# Patient Record
Sex: MALE | URBAN - METROPOLITAN AREA
[De-identification: names, ages, dates, MRNs, and addresses within clinical notes are randomized per-mention and may not be internally consistent; named-entity substitution may affect disease eponyms.]

---

## 2017-03-07 ENCOUNTER — EMERGENCY (EMERGENCY)
Facility: HOSPITAL | Age: 47
LOS: 1 days | Discharge: HOME | End: 2017-03-07
Attending: EMERGENCY MEDICINE | Admitting: EMERGENCY MEDICINE
Payer: COMMERCIAL

## 2017-03-07 VITALS
OXYGEN SATURATION: 99 % | HEART RATE: 56 BPM | TEMPERATURE: 99 F | SYSTOLIC BLOOD PRESSURE: 118 MMHG | DIASTOLIC BLOOD PRESSURE: 75 MMHG | RESPIRATION RATE: 20 BRPM

## 2017-03-07 VITALS
DIASTOLIC BLOOD PRESSURE: 79 MMHG | WEIGHT: 240.08 LBS | HEART RATE: 60 BPM | SYSTOLIC BLOOD PRESSURE: 123 MMHG | HEIGHT: 73 IN | TEMPERATURE: 99 F | OXYGEN SATURATION: 100 % | RESPIRATION RATE: 18 BRPM

## 2017-03-07 LAB
ALBUMIN SERPL ELPH-MCNC: 4.7 G/DL — SIGNIFICANT CHANGE UP (ref 3.3–5)
ALP SERPL-CCNC: 66 U/L — SIGNIFICANT CHANGE UP (ref 40–120)
ALT FLD-CCNC: 26 U/L RC — SIGNIFICANT CHANGE UP (ref 10–45)
ANION GAP SERPL CALC-SCNC: 12 MMOL/L — SIGNIFICANT CHANGE UP (ref 5–17)
APPEARANCE UR: CLEAR — SIGNIFICANT CHANGE UP
AST SERPL-CCNC: 25 U/L — SIGNIFICANT CHANGE UP (ref 10–40)
BASOPHILS # BLD AUTO: 0.1 K/UL — SIGNIFICANT CHANGE UP (ref 0–0.2)
BASOPHILS NFR BLD AUTO: 0.7 % — SIGNIFICANT CHANGE UP (ref 0–2)
BILIRUB SERPL-MCNC: 0.5 MG/DL — SIGNIFICANT CHANGE UP (ref 0.2–1.2)
BILIRUB UR-MCNC: NEGATIVE — SIGNIFICANT CHANGE UP
BUN SERPL-MCNC: 15 MG/DL — SIGNIFICANT CHANGE UP (ref 7–23)
CALCIUM SERPL-MCNC: 9.3 MG/DL — SIGNIFICANT CHANGE UP (ref 8.4–10.5)
CHLORIDE SERPL-SCNC: 102 MMOL/L — SIGNIFICANT CHANGE UP (ref 96–108)
CO2 SERPL-SCNC: 27 MMOL/L — SIGNIFICANT CHANGE UP (ref 22–31)
COLOR SPEC: COLORLESS — SIGNIFICANT CHANGE UP
CREAT SERPL-MCNC: 1.18 MG/DL — SIGNIFICANT CHANGE UP (ref 0.5–1.3)
DIFF PNL FLD: NEGATIVE — SIGNIFICANT CHANGE UP
EOSINOPHIL # BLD AUTO: 0.2 K/UL — SIGNIFICANT CHANGE UP (ref 0–0.5)
EOSINOPHIL NFR BLD AUTO: 2.4 % — SIGNIFICANT CHANGE UP (ref 0–6)
GAS PNL BLDV: SIGNIFICANT CHANGE UP
GLUCOSE SERPL-MCNC: 103 MG/DL — HIGH (ref 70–99)
GLUCOSE UR QL: NEGATIVE — SIGNIFICANT CHANGE UP
HCT VFR BLD CALC: 43.1 % — SIGNIFICANT CHANGE UP (ref 39–50)
HGB BLD-MCNC: 14.8 G/DL — SIGNIFICANT CHANGE UP (ref 13–17)
KETONES UR-MCNC: NEGATIVE — SIGNIFICANT CHANGE UP
LEUKOCYTE ESTERASE UR-ACNC: NEGATIVE — SIGNIFICANT CHANGE UP
LIDOCAIN IGE QN: 38 U/L — SIGNIFICANT CHANGE UP (ref 7–60)
LYMPHOCYTES # BLD AUTO: 2.3 K/UL — SIGNIFICANT CHANGE UP (ref 1–3.3)
LYMPHOCYTES # BLD AUTO: 29.9 % — SIGNIFICANT CHANGE UP (ref 13–44)
MCHC RBC-ENTMCNC: 29.8 PG — SIGNIFICANT CHANGE UP (ref 27–34)
MCHC RBC-ENTMCNC: 34.3 GM/DL — SIGNIFICANT CHANGE UP (ref 32–36)
MCV RBC AUTO: 86.9 FL — SIGNIFICANT CHANGE UP (ref 80–100)
MONOCYTES # BLD AUTO: 0.7 K/UL — SIGNIFICANT CHANGE UP (ref 0–0.9)
MONOCYTES NFR BLD AUTO: 8.8 % — SIGNIFICANT CHANGE UP (ref 2–14)
NEUTROPHILS # BLD AUTO: 4.6 K/UL — SIGNIFICANT CHANGE UP (ref 1.8–7.4)
NEUTROPHILS NFR BLD AUTO: 58.2 % — SIGNIFICANT CHANGE UP (ref 43–77)
NITRITE UR-MCNC: NEGATIVE — SIGNIFICANT CHANGE UP
PH UR: 6 — SIGNIFICANT CHANGE UP (ref 4.8–8)
PLATELET # BLD AUTO: 184 K/UL — SIGNIFICANT CHANGE UP (ref 150–400)
POTASSIUM SERPL-MCNC: 4.3 MMOL/L — SIGNIFICANT CHANGE UP (ref 3.5–5.3)
POTASSIUM SERPL-SCNC: 4.3 MMOL/L — SIGNIFICANT CHANGE UP (ref 3.5–5.3)
PROT SERPL-MCNC: 7.3 G/DL — SIGNIFICANT CHANGE UP (ref 6–8.3)
PROT UR-MCNC: NEGATIVE — SIGNIFICANT CHANGE UP
RBC # BLD: 4.96 M/UL — SIGNIFICANT CHANGE UP (ref 4.2–5.8)
RBC # FLD: 12.2 % — SIGNIFICANT CHANGE UP (ref 10.3–14.5)
SODIUM SERPL-SCNC: 141 MMOL/L — SIGNIFICANT CHANGE UP (ref 135–145)
SP GR SPEC: 1 — LOW (ref 1.01–1.02)
UROBILINOGEN FLD QL: NEGATIVE — SIGNIFICANT CHANGE UP
WBC # BLD: 7.8 K/UL — SIGNIFICANT CHANGE UP (ref 3.8–10.5)
WBC # FLD AUTO: 7.8 K/UL — SIGNIFICANT CHANGE UP (ref 3.8–10.5)

## 2017-03-07 PROCEDURE — 84132 ASSAY OF SERUM POTASSIUM: CPT

## 2017-03-07 PROCEDURE — 74176 CT ABD & PELVIS W/O CONTRAST: CPT | Mod: 26

## 2017-03-07 PROCEDURE — 82435 ASSAY OF BLOOD CHLORIDE: CPT

## 2017-03-07 PROCEDURE — 84295 ASSAY OF SERUM SODIUM: CPT

## 2017-03-07 PROCEDURE — 99284 EMERGENCY DEPT VISIT MOD MDM: CPT | Mod: 25

## 2017-03-07 PROCEDURE — 83690 ASSAY OF LIPASE: CPT

## 2017-03-07 PROCEDURE — 99285 EMERGENCY DEPT VISIT HI MDM: CPT

## 2017-03-07 PROCEDURE — 81001 URINALYSIS AUTO W/SCOPE: CPT

## 2017-03-07 PROCEDURE — 80053 COMPREHEN METABOLIC PANEL: CPT

## 2017-03-07 PROCEDURE — 85027 COMPLETE CBC AUTOMATED: CPT

## 2017-03-07 PROCEDURE — 83605 ASSAY OF LACTIC ACID: CPT

## 2017-03-07 PROCEDURE — 82947 ASSAY GLUCOSE BLOOD QUANT: CPT

## 2017-03-07 PROCEDURE — 96374 THER/PROPH/DIAG INJ IV PUSH: CPT

## 2017-03-07 PROCEDURE — 85014 HEMATOCRIT: CPT

## 2017-03-07 PROCEDURE — 82803 BLOOD GASES ANY COMBINATION: CPT

## 2017-03-07 PROCEDURE — 74176 CT ABD & PELVIS W/O CONTRAST: CPT

## 2017-03-07 PROCEDURE — 82330 ASSAY OF CALCIUM: CPT

## 2017-03-07 RX ORDER — OXYCODONE HYDROCHLORIDE 5 MG/1
1 TABLET ORAL
Qty: 12 | Refills: 0 | OUTPATIENT
Start: 2017-03-07

## 2017-03-07 RX ORDER — KETOROLAC TROMETHAMINE 30 MG/ML
15 SYRINGE (ML) INJECTION ONCE
Qty: 0 | Refills: 0 | Status: DISCONTINUED | OUTPATIENT
Start: 2017-03-07 | End: 2017-03-07

## 2017-03-07 RX ORDER — OXYCODONE HYDROCHLORIDE 5 MG/1
1 TABLET ORAL
Qty: 12 | Refills: 0
Start: 2017-03-07

## 2017-03-07 RX ORDER — SODIUM CHLORIDE 9 MG/ML
1000 INJECTION INTRAMUSCULAR; INTRAVENOUS; SUBCUTANEOUS ONCE
Qty: 0 | Refills: 0 | Status: COMPLETED | OUTPATIENT
Start: 2017-03-07 | End: 2017-03-07

## 2017-03-07 RX ADMIN — SODIUM CHLORIDE 2000 MILLILITER(S): 9 INJECTION INTRAMUSCULAR; INTRAVENOUS; SUBCUTANEOUS at 12:08

## 2017-03-07 RX ADMIN — Medication 15 MILLIGRAM(S): at 12:08

## 2017-03-07 NOTE — ED PROVIDER NOTE - OBJECTIVE STATEMENT
Resident: 46y M presents with LLQ pain x 1 day. Pain woke him from sleep, is dull/achey, severe, worse with movement, no relieving factors or meds, non-radiating. Never had this pain before. Denies nausea, vomiting, diarrhea, fever, chills.    No PMD at this time.     Pharm: [facility], [address], [place]

## 2017-03-07 NOTE — ED PROVIDER NOTE - CHPI ED SYMPTOMS NEG
no fever/no diarrhea/no burning urination/no vomiting/no dysuria/no chills/no blood in stool/no hematuria/no nausea

## 2017-03-07 NOTE — ED PROVIDER NOTE - PLAN OF CARE
1. Epiploic appendagitis  2. Take ibuprofen every 4-6 hours as needed for pain control. For severe pain, take oxycodone as directed.  3. Follow-up with your primary care doctor in 24-48hrs.  4. Return immediately to the emergency department if any worsening or concerning symptoms.

## 2017-03-07 NOTE — ED PROVIDER NOTE - CARE PLAN
Principal Discharge DX:	Abdominal pain Principal Discharge DX:	Epiploic appendagitis  Instructions for follow-up, activity and diet:	1. Epiploic appendagitis  2. Take ibuprofen every 4-6 hours as needed for pain control. For severe pain, take oxycodone as directed.  3. Follow-up with your primary care doctor in 24-48hrs.  4. Return immediately to the emergency department if any worsening or concerning symptoms.

## 2017-03-07 NOTE — ED PROVIDER NOTE - MEDICAL DECISION MAKING DETAILS
Resident: LLQ pain. well-appearing, afebrile. tenderness to palpation llq and periumbilical. Concern for diverticulitis. CT, fluids, pain control, basic labs, reassess. Resident: 46y M presents with LLQ pain x 1 day. Pain woke him from sleep, LLQ pain. well-appearing, afebrile. tenderness to palpation llq and periumbilical. Concern for diverticulitis. CT abd, fluids, pain control, basic labs, reassess.

## 2017-03-07 NOTE — ED PROVIDER NOTE - PROGRESS NOTE DETAILS
JOYCE: Pain better, declined MS, pending CT Resident: discussed CT results with patient. Will send home with pain meds. all patient questions answered. -AV

## 2017-03-07 NOTE — ED PROVIDER NOTE - ATTENDING CONTRIBUTION TO CARE
46y M presents with LLQ pain x 1 day. Pain woke him from sleep, LLQ pain. well-appearing, afebrile. tenderness to palpation llq and periumbilical. Concern for diverticulitis. CT abd, fluids, pain control, basic labs, reassess.

## 2017-03-07 NOTE — ED ADULT NURSE NOTE - CHPI ED SYMPTOMS NEG
no vomiting/no blood in stool/no nausea/no dysuria/no burning urination/no fever/no hematuria/no chills/no diarrhea

## 2017-03-11 DIAGNOSIS — Z88.8 ALLERGY STATUS TO OTHER DRUGS, MEDICAMENTS AND BIOLOGICAL SUBSTANCES STATUS: ICD-10-CM

## 2017-03-11 DIAGNOSIS — R10.32 LEFT LOWER QUADRANT PAIN: ICD-10-CM

## 2017-03-11 DIAGNOSIS — K63.89 OTHER SPECIFIED DISEASES OF INTESTINE: ICD-10-CM

## 2017-03-11 DIAGNOSIS — Z87.891 PERSONAL HISTORY OF NICOTINE DEPENDENCE: ICD-10-CM

## 2017-03-14 PROBLEM — Z00.00 ENCOUNTER FOR PREVENTIVE HEALTH EXAMINATION: Status: ACTIVE | Noted: 2017-03-14

## 2017-06-01 ENCOUNTER — APPOINTMENT (OUTPATIENT)
Dept: INTERNAL MEDICINE | Facility: CLINIC | Age: 47
End: 2017-06-01

## 2017-06-01 ENCOUNTER — RECORD ABSTRACTING (OUTPATIENT)
Age: 47
End: 2017-06-01

## 2017-06-01 DIAGNOSIS — Q23.1 CONGENITAL INSUFFICIENCY OF AORTIC VALVE: ICD-10-CM

## 2017-06-01 DIAGNOSIS — E66.9 OBESITY, UNSPECIFIED: ICD-10-CM

## 2017-06-01 DIAGNOSIS — Z87.891 PERSONAL HISTORY OF NICOTINE DEPENDENCE: ICD-10-CM

## 2017-06-01 DIAGNOSIS — J45.909 UNSPECIFIED ASTHMA, UNCOMPLICATED: ICD-10-CM

## 2017-06-01 DIAGNOSIS — J98.4 OTHER DISORDERS OF LUNG: ICD-10-CM

## 2017-06-01 DIAGNOSIS — G47.33 OBSTRUCTIVE SLEEP APNEA (ADULT) (PEDIATRIC): ICD-10-CM

## 2017-06-01 DIAGNOSIS — C43.9 MALIGNANT MELANOMA OF SKIN, UNSPECIFIED: ICD-10-CM

## 2017-06-01 DIAGNOSIS — M10.9 GOUT, UNSPECIFIED: ICD-10-CM

## 2017-06-01 DIAGNOSIS — I38 ENDOCARDITIS, VALVE UNSPECIFIED: ICD-10-CM

## 2017-06-29 ENCOUNTER — APPOINTMENT (OUTPATIENT)
Dept: MRI IMAGING | Facility: CLINIC | Age: 47
End: 2017-06-29

## 2017-06-29 ENCOUNTER — OUTPATIENT (OUTPATIENT)
Dept: OUTPATIENT SERVICES | Facility: HOSPITAL | Age: 47
LOS: 1 days | End: 2017-06-29
Payer: COMMERCIAL

## 2017-06-29 DIAGNOSIS — Z00.8 ENCOUNTER FOR OTHER GENERAL EXAMINATION: ICD-10-CM

## 2017-06-29 PROCEDURE — A9585: CPT

## 2017-06-29 PROCEDURE — C8902: CPT

## 2017-06-29 PROCEDURE — C8911: CPT

## 2020-01-16 ENCOUNTER — OUTPATIENT (OUTPATIENT)
Dept: OUTPATIENT SERVICES | Facility: HOSPITAL | Age: 50
LOS: 1 days | Discharge: HOME | End: 2020-01-16

## 2020-01-16 VITALS
OXYGEN SATURATION: 99 % | DIASTOLIC BLOOD PRESSURE: 86 MMHG | TEMPERATURE: 97 F | HEART RATE: 63 BPM | WEIGHT: 259.93 LBS | RESPIRATION RATE: 19 BRPM | SYSTOLIC BLOOD PRESSURE: 145 MMHG | HEIGHT: 73 IN

## 2020-01-16 DIAGNOSIS — Z12.11 ENCOUNTER FOR SCREENING FOR MALIGNANT NEOPLASM OF COLON: ICD-10-CM

## 2020-01-16 DIAGNOSIS — Z85.828 PERSONAL HISTORY OF OTHER MALIGNANT NEOPLASM OF SKIN: Chronic | ICD-10-CM

## 2020-01-16 RX ORDER — ASPIRIN/CALCIUM CARB/MAGNESIUM 324 MG
1 TABLET ORAL
Qty: 0 | Refills: 0 | DISCHARGE

## 2020-01-16 RX ORDER — ALLOPURINOL 300 MG
1 TABLET ORAL
Qty: 0 | Refills: 0 | DISCHARGE

## 2020-01-16 RX ORDER — ALLOPURINOL 300 MG
0 TABLET ORAL
Qty: 0 | Refills: 0 | DISCHARGE

## 2020-01-23 DIAGNOSIS — Z91.041 RADIOGRAPHIC DYE ALLERGY STATUS: ICD-10-CM

## 2020-01-23 DIAGNOSIS — Z12.11 ENCOUNTER FOR SCREENING FOR MALIGNANT NEOPLASM OF COLON: ICD-10-CM

## 2020-01-23 DIAGNOSIS — Z99.89 DEPENDENCE ON OTHER ENABLING MACHINES AND DEVICES: ICD-10-CM

## 2020-01-23 DIAGNOSIS — Z85.820 PERSONAL HISTORY OF MALIGNANT MELANOMA OF SKIN: ICD-10-CM

## 2020-01-23 DIAGNOSIS — K64.8 OTHER HEMORRHOIDS: ICD-10-CM

## 2020-01-23 DIAGNOSIS — G47.33 OBSTRUCTIVE SLEEP APNEA (ADULT) (PEDIATRIC): ICD-10-CM

## 2020-01-23 DIAGNOSIS — M10.9 GOUT, UNSPECIFIED: ICD-10-CM

## 2020-01-23 DIAGNOSIS — K57.30 DIVERTICULOSIS OF LARGE INTESTINE WITHOUT PERFORATION OR ABSCESS WITHOUT BLEEDING: ICD-10-CM

## 2020-01-23 DIAGNOSIS — Q23.1 CONGENITAL INSUFFICIENCY OF AORTIC VALVE: ICD-10-CM

## 2020-01-23 DIAGNOSIS — Z79.82 LONG TERM (CURRENT) USE OF ASPIRIN: ICD-10-CM

## 2020-09-18 ENCOUNTER — TRANSCRIPTION ENCOUNTER (OUTPATIENT)
Age: 50
End: 2020-09-18

## 2020-12-24 ENCOUNTER — TRANSCRIPTION ENCOUNTER (OUTPATIENT)
Age: 50
End: 2020-12-24

## 2021-03-29 ENCOUNTER — TRANSCRIPTION ENCOUNTER (OUTPATIENT)
Age: 51
End: 2021-03-29

## 2023-03-07 NOTE — ASU PREOP CHECKLIST - ANTIBIOTIC
n/a Topical Steroids Applications Pregnancy And Lactation Text: Most topical steroids are considered safe to use during pregnancy and lactation.  Any topical steroid applied to the breast or nipple should be washed off before breastfeeding.

## 2023-11-27 ENCOUNTER — NON-APPOINTMENT (OUTPATIENT)
Age: 53
End: 2023-11-27

## 2024-02-13 PROBLEM — Q23.1 CONGENITAL INSUFFICIENCY OF AORTIC VALVE: Chronic | Status: ACTIVE | Noted: 2020-01-16

## 2024-02-13 PROBLEM — C44.90 UNSPECIFIED MALIGNANT NEOPLASM OF SKIN, UNSPECIFIED: Chronic | Status: ACTIVE | Noted: 2020-01-16

## 2024-02-13 PROBLEM — M10.9 GOUT, UNSPECIFIED: Chronic | Status: ACTIVE | Noted: 2020-01-16

## 2024-02-14 ENCOUNTER — APPOINTMENT (OUTPATIENT)
Dept: ORTHOPEDIC SURGERY | Facility: CLINIC | Age: 54
End: 2024-02-14
Payer: COMMERCIAL

## 2024-02-14 VITALS — BODY MASS INDEX: 35.78 KG/M2 | HEIGHT: 73 IN | WEIGHT: 270 LBS

## 2024-02-14 PROCEDURE — 99214 OFFICE O/P EST MOD 30 MIN: CPT

## 2024-02-14 RX ORDER — ALLOPURINOL 300 MG/1
300 TABLET ORAL
Refills: 0 | Status: ACTIVE | COMMUNITY

## 2024-02-18 NOTE — DATA REVIEWED
[FreeTextEntry1] : On my interpretation of these images MRI, cervical spine [place], imaging 2/10/24 C3/4 left foraminal discrimination in pinging left exiting C4 nerve. C4/5 left sided herniation encroaching left C5 nerve root. C5/6 right sided discrimination impinging right side, C6 nerve   Thoracic MRI, 2/10/24, [facility] Multiple thoracic disc herniation.  T1/2 right sided subarticular herniation T2/3 bilateral herniation impinging ventral aspect of cord T3/4 large bulge in pinging ventral, spinal cord T4/5 disc bulge and pinging  Lumbar 2/10/24 L1/2 HNP central with crowding cauda equina.   I stop paperwork revieweed

## 2024-02-18 NOTE — PHYSICAL EXAM
[place] back from [place].  Asked if she could come in at 2PM today.  Informed her that you are full.  Would you be ok with her seeing Katherine or Dr. Garcia tomorrow?  Or do you want to see her sometime next week?   [Biceps 2+] : biceps 2+ [Triceps 2+] : triceps 2+ [Brachioradialis 2+] : brachioradialis 2+ [Normal Coordination] : normal coordination [Normal DTR UE/LE] : normal DTR UE/LE  [Normal Sensation] : normal sensation [Normal Mood and Affect] : normal mood and affect [Oriented] : oriented [Able to Communicate] : able to communicate [Normal Skin] : normal skin [No Rash] : no rash [No Ulcers] : no ulcers [No Lesions] : no lesions [No obvious lymphadenopathy in areas examined] : no obvious lymphadenopathy in areas examined [Well Developed] : well developed [Peripheral vascular exam is grossly normal] : peripheral vascular exam is grossly normal [No Respiratory Distress] : no respiratory distress [de-identified] : Constitutional: - General Appearance: Unremarkable Body Habitus Well Developed Well Nourished Body Habitus No Deformities Well Groomed Ability To communicate: Normal Neurologic: Global sensation is intact to upper and lower extremities. See examination of Neck and/or Spine for exceptions. Orientation to Time, Place and Person is: Normal Mood And Affect is Normal Skin: - Head/Face, Right Upper/Lower Extremity, Left Upper/Lower Extremity: Normal See Examination of Neck and/or Spine for exceptions Cardiovascular: Peripheral Cardiovascular System is Normal Palpation of Lymph Nodes: Normal Palpation of lymph nodes in: Axilla, Cervical, Inguinal Abnormal Palpation of lymph nodes in: None  [] : negative equivocal straight leg raise [FreeTextEntry9] : mild stiffness and pain in all planes

## 2024-02-18 NOTE — DISCUSSION/SUMMARY
[de-identified] : Discussed medical mgmt, continue judicious use nsaid, musclke relaxer as prescribed. Discussed exercise based rehabilitation, referred to PT. discussed interventional pain mgmt , consideration for epidural injection. Discussed surgical intervention, lumbar laminectomy, cervical ACDF if refractory to non operative mgmt. Referred for lower extremity EMG to help differentiate lumbar related symptoms from possible peripheral neuropathy.

## 2024-02-18 NOTE — HISTORY OF PRESENT ILLNESS
[de-identified] : 02/14/2024 - Patient presents to the office today for evaluation with regard to cervical, thoracic and lumbar disc herniation. Patient indicates a multiple decade, history of intermittent, spinal pain often times self manage with activity modifications and over-the-counter medication. Several weeks ago he began to experience new onset numbness, bilateral feet. Initial work out by primary care and podiatry was negative. Patient symptoms increased with sciatica into the right leg requiring emergency room evaluation several days ago at [facility]. Multiple MRIs were performed of the cervical thoracic and lumbar spine identify disc herniations in cervical thoracic and lumbar region. Concerns were raised regarding L1/2 disc herniation crowding, cauda equina. Patient was started on IV prednisone with 50 mg oral taper. Symptoms have improved with regard to cervical pain, shoulder pain and right leg symptoms. He continues to offer complaints of sitting intolerance and subjective weakness in the right leg. Denies bowel or bladder changes. Denies saddle anesthesia.   The patient is a 53 year old male who presents today complaining of back pain Date of Injury/Onset: December  Pain:    At Rest: 6/10 With Activity:  12/10 Mechanism of injury: onset Quality of symptoms: Sharp, Shooting, tingling, burning, numbness Improves with: advil Worse with: moving Prior treatment: no Prior Imaging: MRI @ [facility] Additional Information: None

## 2024-02-18 NOTE — REASON FOR VISIT
[Spouse] : spouse [FreeTextEntry2] : new patient- back pain . Patient seen in consultation at the request of Dr. Deng Akins

## 2024-02-26 ENCOUNTER — APPOINTMENT (OUTPATIENT)
Dept: NEUROLOGY | Facility: CLINIC | Age: 54
End: 2024-02-26
Payer: COMMERCIAL

## 2024-02-26 PROCEDURE — 95886 MUSC TEST DONE W/N TEST COMP: CPT

## 2024-02-26 PROCEDURE — 95912 NRV CNDJ TEST 11-12 STUDIES: CPT

## 2024-02-28 ENCOUNTER — APPOINTMENT (OUTPATIENT)
Dept: ORTHOPEDIC SURGERY | Facility: CLINIC | Age: 54
End: 2024-02-28
Payer: COMMERCIAL

## 2024-02-28 VITALS — HEIGHT: 73 IN | BODY MASS INDEX: 35.78 KG/M2 | WEIGHT: 270 LBS

## 2024-02-28 DIAGNOSIS — Z78.9 OTHER SPECIFIED HEALTH STATUS: ICD-10-CM

## 2024-02-28 PROCEDURE — 99214 OFFICE O/P EST MOD 30 MIN: CPT

## 2024-02-28 RX ORDER — PREGABALIN 75 MG/1
75 CAPSULE ORAL TWICE DAILY
Qty: 60 | Refills: 1 | Status: ACTIVE | COMMUNITY
Start: 2024-02-28 | End: 1900-01-01

## 2024-03-02 NOTE — DATA REVIEWED
[EMG Nerve Conduction] : A EMG Nerve Conduction test was completed of the [Lower extremity] : lower extremity [FreeTextEntry1] : On my interpretation of these images MRI, cervical spine [place], imaging 2/10/24 C3/4 left foraminal discrimination in pinging left exiting C4 nerve. C4/5 left sided herniation encroaching left C5 nerve root. C5/6 right sided discrimination impinging right side, C6 nerve   Thoracic MRI, 2/10/24, [facility] Emerson Multiple thoracic disc herniation.  T1/2 right sided subarticular herniation T2/3 bilateral herniation impinging ventral aspect of cord T3/4 large bulge in pinging ventral, spinal cord T4/5 disc bulge and pinging  Lumbar 2/10/24 L1/2 HNP central with crowding cauda equina. L4/5 mod stenosis with 5 nerve root impingement, small hnp RT side L5S1.   I stop paperwork reviewed

## 2024-03-02 NOTE — PHYSICAL EXAM
[Normal Coordination] : normal coordination [Normal DTR UE/LE] : normal DTR UE/LE  [Normal Sensation] : normal sensation [Normal Mood and Affect] : normal mood and affect [Oriented] : oriented [Able to Communicate] : able to communicate [Normal Skin] : normal skin [No Ulcers] : no ulcers [No Rash] : no rash [No Lesions] : no lesions [No obvious lymphadenopathy in areas examined] : no obvious lymphadenopathy in areas examined [No Respiratory Distress] : no respiratory distress [Well Developed] : well developed [Peripheral vascular exam is grossly normal] : peripheral vascular exam is grossly normal [Biceps 2+] : biceps 2+ [Brachioradialis 2+] : brachioradialis 2+ [Triceps 2+] : triceps 2+ [de-identified] : Constitutional: - General Appearance: Unremarkable Body Habitus Well Developed Well Nourished Body Habitus No Deformities Well Groomed Ability To communicate: Normal Neurologic: Global sensation is intact to upper and lower extremities. See examination of Neck and/or Spine for exceptions. Orientation to Time, Place and Person is: Normal Mood And Affect is Normal Skin: - Head/Face, Right Upper/Lower Extremity, Left Upper/Lower Extremity: Normal See Examination of Neck and/or Spine for exceptions Cardiovascular: Peripheral Cardiovascular System is Normal Palpation of Lymph Nodes: Normal Palpation of lymph nodes in: Axilla, Cervical, Inguinal Abnormal Palpation of lymph nodes in: None  [de-identified] : left wrist extensor 3/5 [] : negative equivocal straight leg raise [FreeTextEntry9] : mild stiffness and pain in all planes

## 2024-03-02 NOTE — HISTORY OF PRESENT ILLNESS
[de-identified] : 02/28/2024 - Patient presenting in follow up and review of EMG. Neck SXs > Lumbar SXs. Cervical SXs: Significant neck, proximal LT shoulder/ arm pain. States pain if effecting quality of life and he is considering surgery. Lumbar SXs: radiating right buttock/hip pain, posterior radiating leg pain. He denies any saddle anesthesia, and has full control of bowel/bladder. He reports some relief of neck, LT proximal UE/shoulder pain, and radiating leg pain after oral steroid but notes a recent return of pain. The burning/numbness in his feet remained unchanged, significant and constant in nature. He has been enrolled in PT but pain has been persistent.   02/14/2024 - Patient presents to the office today for evaluation with regard to cervical, thoracic and lumbar disc herniation. Patient indicates a multiple decade, history of intermittent, spinal pain often times self manage with activity modifications and over-the-counter medication. Several weeks ago he began to experience new onset numbness, bilateral feet. Initial work out by primary care and podiatry was negative. Patient symptoms increased with sciatica into the right leg requiring emergency room evaluation several days ago at [facility]. Multiple MRIs were performed of the cervical thoracic and lumbar spine identify disc herniations in cervical thoracic and lumbar region. Concerns were raised regarding L1/2 disc herniation crowding, cauda equina. Patient was started on IV prednisone with 50 mg oral taper. Symptoms have improved with regard to cervical pain, shoulder pain and right leg symptoms. He continues to offer complaints of sitting intolerance and subjective weakness in the right leg. Denies bowel or bladder changes. Denies saddle anesthesia.   The patient is a 53 year old male who presents today complaining of back pain Date of Injury/Onset: December  Pain:    At Rest: 6/10 With Activity:  12/10 Mechanism of injury: onset Quality of symptoms: Sharp, Shooting, tingling, burning, numbness Improves with: advil Worse with: moving Prior treatment: no Prior Imaging: MRI @ [facility] Additional Information: None

## 2024-03-02 NOTE — DISCUSSION/SUMMARY
[de-identified] : Cervical:  - C3/4 left foraminal discrimination in pinging left exiting C4 nerve. C4/5 left sided herniation encroaching left C5 nerve root. C5/6 right sided discrimination impinging right side, C6 nerve  I discussed with the patient that since he has exhausted conservative treatments in the form of multiple medications, physical therapy, and LUE proximal arm pain has become functionally incapacitating (left upper ext radic and wrist extensor weakness 3/5) despite conservative management, he is indicated for ACDF C5-6,6-7. Surgical risks, benefits and expected outcomes have been explained to the patient. Patient was understanding and in agreement. Will proceed with ACDF. Follow up with surgical [facility].   Lumbar:  - L1/2 HNP central with crowding cauda equina. L4/5 mod stenosis with 5 nerve root impingement, small hnp RT side L5S1.  Discussed primary source of lumbar mediated symptoms stem from rt sided L5S1 HNP. Before he is a candidate for laminectomy, recommend patient proceeds with LESI to be therapeutic but also diagnostic.  Prior to appointment and during encounter with patient extensive medical records were reviewed including but not limited to, hospital records, outpatient records, imaging results, and lab data.During this appointment the patient was examined, diagnoses were discussed and explained in a face to face manner. In addition extensive time was spent reviewing aforementioned diagnostic studies. Counseling including abnormal image results, differential diagnoses, treatment options, risk and benefits, lifestyle changes, current condition, and current medications was performed. Patient's comments, questions, and concerns were addressed and patient verbalized understanding. Based on this patient's presentation at our office, which is an orthopedic spine surgeon's office, this patient inherently / intrinsically has a risk, however minute, of developing issues such as Cauda equina syndrome, bowel and bladder changes, or progression of motor or neurological deficits such as paralysis which may be permanent.  TIFFANY HOWARD Acting as a Scribe for Dr. Miguel Ángel MCGHEE, Tiffany Howard, attest that this documentation has been prepared under the direction and in the presence of Provider Kenny Del Rosario MD.

## 2024-03-25 ENCOUNTER — APPOINTMENT (OUTPATIENT)
Dept: ORTHOPEDIC SURGERY | Facility: HOSPITAL | Age: 54
End: 2024-03-25
Payer: COMMERCIAL

## 2024-03-25 PROCEDURE — 22551 ARTHRD ANT NTRBDY CERVICAL: CPT | Mod: AS

## 2024-03-25 PROCEDURE — 22552 ARTHRD ANT NTRBD CERVICAL EA: CPT | Mod: AS

## 2024-03-25 PROCEDURE — 22551 ARTHRD ANT NTRBDY CERVICAL: CPT

## 2024-03-25 PROCEDURE — 22845 INSERT SPINE FIXATION DEVICE: CPT | Mod: AS

## 2024-03-25 PROCEDURE — 22552 ARTHRD ANT NTRBD CERVICAL EA: CPT

## 2024-03-25 PROCEDURE — 22853 INSJ BIOMECHANICAL DEVICE: CPT | Mod: 59

## 2024-03-25 PROCEDURE — 22845 INSERT SPINE FIXATION DEVICE: CPT | Mod: 59

## 2024-03-25 PROCEDURE — 22853 INSJ BIOMECHANICAL DEVICE: CPT | Mod: AS,59

## 2024-03-29 RX ORDER — METHYLPREDNISOLONE 4 MG/1
4 TABLET ORAL
Qty: 1 | Refills: 0 | Status: ACTIVE | COMMUNITY
Start: 2024-03-29 | End: 1900-01-01

## 2024-03-29 RX ORDER — HYDROMORPHONE HYDROCHLORIDE 4 MG/1
4 TABLET ORAL EVERY 6 HOURS
Qty: 60 | Refills: 0 | Status: ACTIVE | COMMUNITY
Start: 2024-03-29 | End: 1900-01-01

## 2024-03-29 NOTE — DATA REVIEWED
[MRI] : MRI [Cervical Spine] : cervical spine [I reviewed the films/CD] : I reviewed the films/CD [Report was reviewed and noted in the chart] : The report was reviewed and noted in the chart

## 2024-04-01 ENCOUNTER — APPOINTMENT (OUTPATIENT)
Dept: PAIN MANAGEMENT | Facility: CLINIC | Age: 54
End: 2024-04-01
Payer: COMMERCIAL

## 2024-04-01 VITALS — HEIGHT: 73 IN | BODY MASS INDEX: 35.78 KG/M2 | WEIGHT: 270 LBS

## 2024-04-01 PROCEDURE — 99204 OFFICE O/P NEW MOD 45 MIN: CPT

## 2024-04-01 NOTE — REASON FOR VISIT
[Initial Consultation] : an initial pain management consultation [FreeTextEntry2] : Bilateral lower extremity numbness/tingling

## 2024-04-01 NOTE — ASSESSMENT
[FreeTextEntry1] : A discussion regarding available pain management treatment options occurred with the patient.  These included interventional, rehabilitative, pharmacological, and alternative modalities. We will proceed with the following:    Interventional treatment options:   - Proceed with bilateral L5-S1 TFESI with fluoroscopic guidance - Explained diagnostic and potentially therapeutic role for indicated procedure - If inadequate relief would likely target S1 root - see additional instructions below    Rehabilitative options: - continue physical therapy   - participation in active HEP was discussed    Medication based treatment options:   - Continue OTC NSAIDs on as-needed basis -Would likely retrial Lyrica with failure of interventional therapy - see additional instructions below    Complementary treatment options:   - Weight management and lifestyle modifications discussed    Additional treatment recommendations as follows:   - patient will follow-up with Dr. Del Rosario as directed - Discussed utility of neurological evaluation with ongoing paresthesias of the lower extremities - Follow up 1-2 weeks post injection for assessment of efficacy and further treatment recommendations  The risks, benefits and alternatives of the proposed procedure were explained in detail with the patient. The risks outlined include but are not limited to infection, bleeding, post- dural puncture headache, nerve injury, a temporary increase in pain, failure to resolve symptoms, need for future interventions, allergic reaction, and possible elevation of blood sugar in diabetics if using corticosteroid.  All questions were answered to patient's apparent satisfaction, and he/she verbalized an understanding.  We have discussed the risks, benefits, and alternatives for NSAID therapy including but not limited to the risk of bleeding, thrombosis, gastric mucosal irritation/ulceration, allergic reaction and kidney dysfunction.  The patient verbalizes an understanding.  The documentation recorded by the scribe, in my presence, accurately reflects the service I personally performed and the decisions made by me with my edits as appropriate.   I, Ranjan Tay acting as scribe, attest that this documentation has been prepared under the direction and in the presence of Provider King Pastor DO.

## 2024-04-01 NOTE — HISTORY OF PRESENT ILLNESS
[Lower back] : lower back [Buttock] : buttock [Left Leg] : left leg [Right Leg] : right leg [Sudden] : sudden [Dull/Aching] : dull/aching [6] : 6 [Household chores] : household chores [Intermittent] : intermittent [Leisure] : leisure [FreeTextEntry1] : The patient presents for initial evaluation regarding their neck pain. Patient was referred by Dr. Del Rosario. Patient complains of paresthesias bilaterally in the feet since December, the sensation goes from the toes to the plantar aspect of both feet, he denies any lower back pain at the time; he previously had radicular leg pain which resolved with a MDP. Patient has failed Lyrica at the 150 mg daily dose; and had undergone a C5-7 ACDF with Dr. Del Rosario on 3/25/2024.   Subjective Weakness: No  Numbness/Tingling: Yes  Bladder/Bowel dysfunction: No  Gait Abnormalities: No  Fine motor coordination changes: No   Injections: No    Pertinent Surgical History: 1) C5-7 ACDF (3/25/2024) - Dr. Del Rosario  Imagin) MRI Cervical Spine (2/10/2024) - [facility]  2)MRI lumbar spine (2/10/2024)  - [facility]   Electrodiagnostic Studies: EMG/NCV- 2024 1. Right greater than left, acute and chronic L5 S1 radiculopathy 2. There is no electrophysiologic evidence for isolated left peroneal neuropathy or large fiber polyneuropathy at this time.   Physician Disclaimer: I have personally reviewed and confirmed all HPI data with the patient.  [] : Patient is currently injured and not playing sports: no [de-identified] : lumbar mri at [facility]

## 2024-04-01 NOTE — PHYSICAL EXAM
[de-identified] : Constitutional:   - No acute distress   - Well developed; well nourished    Neurological:   - normal mood and affect   - alert and oriented x 3     Cardiovascular:   - grossly normal   Lumbar Spine Exam:  Inspection: erythema (-) ecchymosis (-) rashes (-) alignment: no scoliosis  Palpation: Midline lumbar tenderness:             (-) midline thoracic tenderness:          (-) Lumbar paraspinal tenderness:      L (-) ; R (-) thoracic paraspinal tenderness:    L (-) ; R (-) sciatic nerve tenderness :             L (-) ; R (-) SI joint tenderness:                        L (-) ; R (-) GTB tenderness:                            L (-);  R (-)  ROM: WNL  Strength:                                    Right       Left    Hip Flexion:                (5/5)       (5/5) Quadriceps:               (5/5)       (5/5) Hamstrings:                (5/5)       (5/5) Ankle Dorsiflexion:     (5/5)       (5/5) EHL:                           (5/5)       (5/5) Ankle Plantarflexion:  (5/5)       (5/5)  Special Tests: SLR:                           R (=) ; L (=) Facet loading:            R (-) ; L (-) JUAREZ test:               R (-) ; L (-) Hamstring tightness:  R (+);  L (+)  Neurologic: SI LT throughout right lower extremity SI LT throughout left lower extremity  Reflexes normal and symmetric bilateral lower extremities  Gait: non- antalgic gait ambulates without assistive device

## 2024-04-10 ENCOUNTER — APPOINTMENT (OUTPATIENT)
Dept: ORTHOPEDIC SURGERY | Facility: CLINIC | Age: 54
End: 2024-04-10
Payer: COMMERCIAL

## 2024-04-10 ENCOUNTER — RESULT CHARGE (OUTPATIENT)
Age: 54
End: 2024-04-10

## 2024-04-10 VITALS — HEIGHT: 73 IN | BODY MASS INDEX: 35.78 KG/M2 | WEIGHT: 270 LBS

## 2024-04-10 PROCEDURE — 72040 X-RAY EXAM NECK SPINE 2-3 VW: CPT

## 2024-04-10 PROCEDURE — 99024 POSTOP FOLLOW-UP VISIT: CPT

## 2024-04-10 RX ORDER — PREGABALIN 75 MG/1
75 CAPSULE ORAL TWICE DAILY
Qty: 60 | Refills: 1 | Status: ACTIVE | COMMUNITY
Start: 2024-04-10 | End: 1900-01-01

## 2024-04-14 NOTE — PHYSICAL EXAM
[Normal Coordination] : normal coordination [Normal DTR UE/LE] : normal DTR UE/LE  [Normal Sensation] : normal sensation [Normal Mood and Affect] : normal mood and affect [Oriented] : oriented [Able to Communicate] : able to communicate [Normal Skin] : normal skin [No Rash] : no rash [No Ulcers] : no ulcers [No Lesions] : no lesions [No obvious lymphadenopathy in areas examined] : no obvious lymphadenopathy in areas examined [Well Developed] : well developed [Peripheral vascular exam is grossly normal] : peripheral vascular exam is grossly normal [No Respiratory Distress] : no respiratory distress [de-identified] : Constitutional: - General Appearance: Unremarkable Body Habitus Well Developed Well Nourished Body Habitus No Deformities Well Groomed Ability To communicate: Normal Neurologic: Global sensation is intact to upper and lower extremities. See examination of Neck and/or Spine for exceptions. Orientation to Time, Place and Person is: Normal Mood And Affect is Normal Skin: - Head/Face, Right Upper/Lower Extremity, Left Upper/Lower Extremity: Normal See Examination of Neck and/or Spine for exceptions Cardiovascular: Peripheral Cardiovascular System is Normal Palpation of Lymph Nodes: Normal Palpation of lymph nodes in: Axilla, Cervical, Inguinal Abnormal Palpation of lymph nodes in: None  [] : non-antalgic [FreeTextEntry9] : mild stiffness and pain in all planes

## 2024-04-14 NOTE — DATA REVIEWED
[EMG Nerve Conduction] : A EMG Nerve Conduction test was completed of the [Lower extremity] : lower extremity [I independently reviewed and interpreted images and report] : I independently reviewed and interpreted images and report [FreeTextEntry1] : I stop paperwork reviewed PAin mgmt progress notes reviewed

## 2024-04-14 NOTE — DISCUSSION/SUMMARY
[de-identified] : Cervical: s/p ACDF C4-6 s/p March 25th 2024.   Xray today reveals good maintenance of alignment and implant placement, good progress to fusion.  Incision appears to be healing nicely, sutures were removed and steri strips applied.  Advised patient on proper wound care and management, proper post op body mechanics and gradual increase in physical activity.  Explained expected outcomes post op including reduction in pain, improvement in function and expected recovery timeframe. All questions were answered to their satisfaction.   Lumbar:  - L1/2 HNP central with crowding cauda equina. L4/5 mod stenosis with 5 nerve root impingement, small hnp RT side L5S1.  Discussed primary source of lumbar mediated symptoms stem from rt sided L5S1 HNP. Before he is a candidate for laminectomy, recommend patient proceeds with LESI to be therapeutic but also diagnostic - continue to follow up with Dr. Pastor. We discussed resuming treatment with Lyrica to see if there is an improvement in symptoms. Briefly discussed neurology consult in the future for further evaluation of symptoms.   Prior to appointment and during encounter with patient extensive medical records were reviewed including but not limited to, hospital records, outpatient records, imaging results, and lab data.During this appointment the patient was examined, diagnoses were discussed and explained in a face to face manner. In addition extensive time was spent reviewing aforementioned diagnostic studies. Counseling including abnormal image results, differential diagnoses, treatment options, risk and benefits, lifestyle changes, current condition, and current medications was performed. Patient's comments, questions, and concerns were addressed and patient verbalized understanding. Based on this patient's presentation at our office, which is an orthopedic spine surgeon's office, this patient inherently / intrinsically has a risk, however minute, of developing issues such as Cauda equina syndrome, bowel and bladder changes, or progression of motor or neurological deficits such as paralysis which may be permanent.  TIFFANY HOWARD Acting as a Scribe for Dr. Miguel Ángel MCGHEE, Tiffany Howard, attest that this documentation has been prepared under the direction and in the presence of Provider Kenny Del Rosario MD.

## 2024-04-14 NOTE — HISTORY OF PRESENT ILLNESS
[de-identified] : 04/10/2024 - Pt presents 1st post op ACDF C4-5,5-6 s/p March 25th 2024.  Pt reports some relief from MDP after 48 hrs. No relief from pain killer. C/o spasms in neck, tingling in shoulder, states it is improved compared to pre op but remains present. Also he c/o lump in throat with swallowing, and migraines although less frequent than preop.  Lumbar: He complains of burning sensation, tingling/numbness in the plantar region of his b/l feet. Denies any real symptoms in his legs, besides from the one event which promoted him to go to the ER in January. Denies sattal anesthesia's. Bowel/bladder is intact. No real relief from Lyrica, he treated with it for 2 wks.   02/28/2024 - Patient presenting in follow up and review of EMG. Neck SXs > Lumbar SXs. Cervical SXs: Significant neck, proximal LT shoulder/ arm pain. States pain if effecting quality of life and he is considering surgery. Lumbar SXs: radiating right buttock/hip pain, posterior radiating leg pain. He denies any saddle anesthesia, and has full control of bowel/bladder. He reports some relief of neck, LT proximal UE/shoulder pain, and radiating leg pain after oral steroid but notes a recent return of pain. The burning/numbness in his feet remained unchanged, significant and constant in nature. He has been enrolled in PT but pain has been persistent.   02/14/2024 - Patient presents to the office today for evaluation with regard to cervical, thoracic and lumbar disc herniation. Patient indicates a multiple decade, history of intermittent, spinal pain often times self manage with activity modifications and over-the-counter medication. Several weeks ago he began to experience new onset numbness, bilateral feet. Initial work out by primary care and podiatry was negative. Patient symptoms increased with sciatica into the right leg requiring emergency room evaluation several days ago at [place]. Multiple MRIs were performed of the cervical thoracic and lumbar spine identify disc herniations in cervical thoracic and lumbar region. Concerns were raised regarding L1/2 disc herniation crowding, cauda equina. Patient was started on IV prednisone with 50 mg oral taper. Symptoms have improved with regard to cervical pain, shoulder pain and right leg symptoms. He continues to offer complaints of sitting intolerance and subjective weakness in the right leg. Denies bowel or bladder changes. Denies saddle anesthesia.   The patient is a 53 year old male who presents today complaining of back pain Date of Injury/Onset: December  Pain:    At Rest: 6/10 With Activity:  12/10 Mechanism of injury: onset Quality of symptoms: Sharp, Shooting, tingling, burning, numbness Improves with: advil Worse with: moving Prior treatment: no Prior Imaging: MRI @ [facility] Additional Information: None

## 2024-04-17 ENCOUNTER — APPOINTMENT (OUTPATIENT)
Dept: PAIN MANAGEMENT | Facility: CLINIC | Age: 54
End: 2024-04-17
Payer: COMMERCIAL

## 2024-04-17 PROCEDURE — 64483 NJX AA&/STRD TFRM EPI L/S 1: CPT | Mod: LT

## 2024-04-17 NOTE — PROCEDURE
[FreeTextEntry3] : Date of Service: 04/17/2024   Account: [id number]  Patient: GOLDEN BOURGEOIS   YOB: 1970  Age: 53 year   Surgeon:      King Pastor DO  Assistant:    None  Pre-Operative Diagnosis:         Lumbosacral Radiculopathy                   Post Operative Diagnosis:       Lumbosacral Radiculopathy                 Procedure:             Bilateral L5-S1 transforaminal epidural steroid injection under fluoroscopic guidance.  Anesthesia: MAC  This procedure was carried out using fluoroscopic guidance.  The risks and benefits of the procedure were discussed extensively with the patient.  The consent of the patient was obtained and the following procedure was performed. The patient was placed in the prone position on the fluoroscopy table and the area was prepped and draped in a sterile fashion.  A timeout was performed with all essential staff present and the site and side were verified.  The right L5-S1 neural foramen was then identified on right oblique "abdias dog" anatomical view at the 6 o' clock position using fluoroscopic guidance, and the area was marked. The overlying skin and subcutaneous structures were anesthetized using sterile technique with 1% Lidocaine.   A 22-gauge 5-inch spinal needle was directed toward the inferior (6 o'clock) position of the pedicle, which formed the roof of the identified foramen.  Once in the epidural space, after negative aspiration for heme and CSF, 1cc of Prohance contrast was injected to confirm epidural location and assess filling defects and rule out intravascular needle placement.   Lumbar epidurogram showed no intravascular or intrathecal flow pattern.  No blood or CSF was aspirated.  Prohance spread medially in epidural space and outlined the exiting nerve root.  After this, 2.5 cc of a mixture of 4 cc of preservative free normal saline plus 40 mg of Kenalog was injected in the epidural space.  Then attention was focused to the left L5-S1 neural foramen. This was then identified on left oblique "abdias dog" anatomical view at the 6 o' clock position using fluoroscopic guidance, and the area was marked.  The overlying skin and subcutaneous structures were anesthetized using sterile technique with 1% Lidocaine.  A 22-gauge 5-inch spinal needle was directed toward the inferior (6 o'clock) position of the pedicle, which formed the roof of the identified foramen.  Once in the epidural space, after negative aspiration for heme and CSF, 1cc of Prohance contrast was injected to confirm epidural location and assess filling defects and rule out intravascular needle placement.   The following contrast flow observed: no intravascular or intrathecal flow pattern was noted.  No blood or CSF was aspirated. Prohance spread medially in epidural space.    After this, the remainder of the injectate listed above was injected in the epidural space.  Vital signs remained normal throughout the procedure.  The patient tolerated the procedure well.  There were no immediate complications from the performed procedure.  The patient was instructed to apply ice over the injection sites for twenty minutes every two hours for the next 24 hours.  Disposition:      1.  The patient was advised to F/U in 1-2 weeks to assess the response to the injection.      2.  The patient was also instructed to contact me immediately if there were any concerns related to the procedure performed.

## 2024-05-01 ENCOUNTER — APPOINTMENT (OUTPATIENT)
Dept: ORTHOPEDIC SURGERY | Facility: CLINIC | Age: 54
End: 2024-05-01
Payer: COMMERCIAL

## 2024-05-01 PROCEDURE — 99024 POSTOP FOLLOW-UP VISIT: CPT

## 2024-05-02 ENCOUNTER — APPOINTMENT (OUTPATIENT)
Dept: PAIN MANAGEMENT | Facility: CLINIC | Age: 54
End: 2024-05-02
Payer: COMMERCIAL

## 2024-05-02 VITALS — BODY MASS INDEX: 36.45 KG/M2 | HEIGHT: 73 IN | WEIGHT: 275 LBS

## 2024-05-02 DIAGNOSIS — M48.061 SPINAL STENOSIS, LUMBAR REGION WITHOUT NEUROGENIC CLAUDICATION: ICD-10-CM

## 2024-05-02 PROCEDURE — 99214 OFFICE O/P EST MOD 30 MIN: CPT

## 2024-05-02 NOTE — PHYSICAL EXAM
[de-identified] : Constitutional:   - No acute distress   - Well developed; well nourished    Neurological:   - normal mood and affect   - alert and oriented x 3     Cardiovascular:   - grossly normal   Lumbar Spine Exam:  Inspection: erythema (-) ecchymosis (-) rashes (-) alignment: no scoliosis  Palpation: Midline lumbar tenderness:             (-) midline thoracic tenderness:          (-) Lumbar paraspinal tenderness:      L (-) ; R (-) thoracic paraspinal tenderness:    L (-) ; R (-) sciatic nerve tenderness :             L (-) ; R (-) SI joint tenderness:                        L (-) ; R (-) GTB tenderness:                            L (-);  R (-)  ROM: WNL  Strength:                                    Right       Left    Hip Flexion:                (5/5)       (5/5) Quadriceps:               (5/5)       (5/5) Hamstrings:                (5/5)       (5/5) Ankle Dorsiflexion:     (5/5)       (5/5) EHL:                           (5/5)       (5/5) Ankle Plantarflexion:  (5/5)       (5/5)  Special Tests: SLR:                           R (=) ; L (=) Facet loading:            R (-) ; L (-) JUAREZ test:               R (-) ; L (-) Hamstring tightness:  R (+);  L (+)  Neurologic: SI LT throughout right lower extremity SI LT throughout left lower extremity  Reflexes normal and symmetric bilateral lower extremities  Gait: non- antalgic gait ambulates without assistive device

## 2024-05-02 NOTE — HISTORY OF PRESENT ILLNESS
[Lower back] : lower back [Buttock] : buttock [Left Leg] : left leg [Right Leg] : right leg [Sudden] : sudden [6] : 6 [Dull/Aching] : dull/aching [Intermittent] : intermittent [Household chores] : household chores [Leisure] : leisure [FreeTextEntry1] : 2024 - Patient presents for FUV bilateral L5-S1 TFESI on 2024. Patient reports no change in pain s/p epidural, he continues to have paresthesias bilaterally in the plantar aspect of his feet.  2024 - The patient presents for initial evaluation regarding their neck pain. Patient was referred by Dr. Del Rosario. Patient complains of paresthesias bilaterally in the feet since December, the sensation goes from the toes to the plantar aspect of both feet, he denies any lower back pain at the time; he previously had radicular leg pain which resolved with a MDP. Patient has failed Lyrica at the 150 mg daily dose; and had undergone a C5-7 ACDF with Dr. Del Rosario on 3/25/2024.   Injections: 1) Bilateral L5-S1 TFESI (2024)  Pertinent Surgical History: 1) C5-7 ACDF (3/25/2024) - Dr. Del Rosario  Imagin) MRI Cervical Spine (2/10/2024) - [facility]  2) MRI lumbar spine (2/10/2024)  - [place]   Electrodiagnostic Studies: EMG/NCV- 2024 1. Right greater than left, acute and chronic L5 S1 radiculopathy 2. There is no electrophysiologic evidence for isolated left peroneal neuropathy or large fiber polyneuropathy at this time.   Physician Disclaimer: I have personally reviewed and confirmed all HPI data with the patient.  [] : Patient is currently injured and not playing sports: no [de-identified] : lumbar mri at [facility]

## 2024-05-02 NOTE — DATA REVIEWED
[EMG Nerve Conduction] : A EMG Nerve Conduction test was completed of the [Lower extremity] : lower extremity [FreeTextEntry1] : On my interpretation of these images MRI, cervical spine [place], imaging 2/10/24 C3/4 left foraminal discrimination in pinging left exiting C4 nerve. C4/5 left sided herniation encroaching left C5 nerve root. C5/6 right sided discrimination impinging right side, C6 nerve   Thoracic MRI, 2/10/24, [place] Multiple thoracic disc herniation.  T1/2 right sided subarticular herniation T2/3 bilateral herniation impinging ventral aspect of cord T3/4 large bulge in pinging ventral, spinal cord T4/5 disc bulge and pinging  Lumbar 2/10/24 L1/2 HNP central with crowding cauda equina. L4/5 mod stenosis with 5 nerve root impingement, small hnp RT side L5S1.   I stop paperwork reviewed Pain mgmt progress notes reviewed

## 2024-05-02 NOTE — REASON FOR VISIT
[Follow-Up Visit] : a follow-up pain management visit [FreeTextEntry2] : Bilateral lower extremity numbness/tingling

## 2024-05-02 NOTE — DISCUSSION/SUMMARY
[de-identified] : Cervical:  54 y/o M s/p ACDF C5-6,6-7 on march 5th 2024.  Patient is recovering well post operatively. Explained expected outcomes post op including reduction in pain, improvement in function and expected recovery timeframe. All questions were answered to their satisfaction. Patient will have in office encounter with new cervical spine X-ray at 3 months post op.  Lumbar:  - L1/2 HNP central with crowding cauda equina. L4/5 mod stenosis with 5 nerve root impingement, small hnp RT side L5S1.  Discussed primary source of lumbar mediated symptoms stem from rt sided L5S1 HNP. Before he is a candidate for laminectomy, recommend patient has transient relief from LESI to be diagnostic. Since there has been no improvement following the LESI performed on 4/24/24, we discussed the next steps in treatment. It was suggested that the patient seeks a neurologist consultation with Dr. Weber to rule out any other pathology contributing to the pain, numbness, and tingling in his feet such as peripheral neuropathy or metabolic disease.. Additionally, I recommend follow-up with Dr. Pastor and advise a LESI at the L1/2 level.  [facility] televisit was initiated by the patient.  Prior to appointment and during encounter with patient extensive medical records were reviewed including but not limited to, hospital records, outpatient records, imaging results, and lab data.During this appointment the patient was examined, diagnoses were discussed and explained in a face to face manner. In addition extensive time was spent reviewing aforementioned diagnostic studies. Counseling including abnormal image results, differential diagnoses, treatment options, risk and benefits, lifestyle changes, current condition, and current medications was performed. Patient's comments, questions, and concerns were addressed and patient verbalized understanding. Based on this patient's presentation at our office, which is an orthopedic spine surgeon's office, this patient inherently / intrinsically has a risk, however minute, of developing issues such as Cauda equina syndrome, bowel and bladder changes, or progression of motor or neurological deficits such as paralysis which may be permanent.  TIFFANY GARZA Acting as a Scribe for Tiffany Chawla, attest that this documentation has been prepared under the direction and in the presence of Provider Kenny Del Rosario MD.

## 2024-05-02 NOTE — HISTORY OF PRESENT ILLNESS
[de-identified] : 05/01/2024 - Patient presents for Televisit. He is s/p ACDF on march 25th 2024. States neck pain severity has improved as a result of surgery, as well as his overall cervical ROM. Feels he is gradually improving with time. However, after the LESI and Lyrica, stil c/o burning, tingling, and numbness in his feet. states foot symptoms are constant and do not worsen with specific activities. JEANIE was performed last Wednesday. Bowel/bladder function is intact.   02/28/2024 - Patient presenting in follow up and review of EMG. Neck SXs > Lumbar SXs. Cervical SXs: Significant neck, proximal LT shoulder/ arm pain. States pain if effecting quality of life and he is considering surgery. Lumbar SXs: radiating right buttock/hip pain, posterior radiating leg pain. He denies any saddle anesthesia, and has full control of bowel/bladder. He reports some relief of neck, LT proximal UE/shoulder pain, and radiating leg pain after oral steroid but notes a recent return of pain. The burning/numbness in his feet remained unchanged, significant and constant in nature. He has been enrolled in PT but pain has been persistent.   02/14/2024 - Patient presents to the office today for evaluation with regard to cervical, thoracic and lumbar disc herniation. Patient indicates a multiple decade, history of intermittent, spinal pain often times self manage with activity modifications and over-the-counter medication. Several weeks ago he began to experience new onset numbness, bilateral feet. Initial work out by primary care and podiatry was negative. Patient symptoms increased with sciatica into the right leg requiring emergency room evaluation several days ago at [place]. Multiple MRIs were performed of the cervical thoracic and lumbar spine identify disc herniations in cervical thoracic and lumbar region. Concerns were raised regarding L1/2 disc herniation crowding, cauda equina. Patient was started on IV prednisone with 50 mg oral taper. Symptoms have improved with regard to cervical pain, shoulder pain and right leg symptoms. He continues to offer complaints of sitting intolerance and subjective weakness in the right leg. Denies bowel or bladder changes. Denies saddle anesthesia.   The patient is a 53 year old male who presents today complaining of back pain Date of Injury/Onset: December  Pain:    At Rest: 6/10 With Activity:  12/10 Mechanism of injury: onset Quality of symptoms: Sharp, Shooting, tingling, burning, numbness Improves with: advil Worse with: moving Prior treatment: no Prior Imaging: MRI @ [facility] Additional Information: None

## 2024-05-02 NOTE — ASSESSMENT
[FreeTextEntry1] : A discussion regarding available pain management treatment options occurred with the patient.  These included interventional, rehabilitative, pharmacological, and alternative modalities. We will proceed with the following:    Interventional treatment options:   - Proceed with bilateral S1 TFESI with fluoroscopic guidance - Once again explained diagnostic and therapeutic role for indicated procedure - see additional instructions below    Rehabilitative options: - continue physical therapy   - participation in active HEP was discussed    Medication based treatment options:   - Continue OTC NSAIDs on as-needed basis - Continue Lyrica 75 mg BID; further titration based upon clinical response - see additional instructions below    Complementary treatment options:   - Weight management and lifestyle modifications discussed    Additional treatment recommendations as follows:   - patient will follow-up with Dr. Del Rosario as directed - Agree with neurological evaluation with ongoing paresthesias of the lower extremities - Follow up 1-2 weeks post injection for assessment of efficacy and further treatment recommendations  The risks, benefits and alternatives of the proposed procedure were explained in detail with the patient. The risks outlined include but are not limited to infection, bleeding, post- dural puncture headache, nerve injury, a temporary increase in pain, failure to resolve symptoms, need for future interventions, allergic reaction, and possible elevation of blood sugar in diabetics if using corticosteroid.  All questions were answered to patient's apparent satisfaction, and he/she verbalized an understanding.  We have discussed the risks, benefits, and alternatives for NSAID therapy including but not limited to the risk of bleeding, thrombosis, gastric mucosal irritation/ulceration, allergic reaction and kidney dysfunction.  The patient verbalizes an understanding.  The documentation recorded by the scribe, in my presence, accurately reflects the service I personally performed and the decisions made by me with my edits as appropriate.   I, Ranjan Tay acting as scribe, attest that this documentation has been prepared under the direction and in the presence of Provider King Pastor DO.

## 2024-05-17 ENCOUNTER — APPOINTMENT (OUTPATIENT)
Dept: PAIN MANAGEMENT | Facility: CLINIC | Age: 54
End: 2024-05-17
Payer: COMMERCIAL

## 2024-05-17 DIAGNOSIS — M54.16 RADICULOPATHY, LUMBAR REGION: ICD-10-CM

## 2024-05-17 PROCEDURE — 64483 NJX AA&/STRD TFRM EPI L/S 1: CPT | Mod: LT

## 2024-05-17 NOTE — PROCEDURE
[FreeTextEntry3] : Date of Service: 05/17/2024   Account: [id number]  Patient: GOLDEN BOURGEOIS   YOB: 1970  Age: 53 year   Surgeon:      King Pastor DO  Assistant:    None  Pre-Operative Diagnosis:         Lumbosacral Radiculopathy                   Post Operative Diagnosis:       Lumbosacral Radiculopathy                 Procedure:             Bilateral S1 transforaminal epidural steroid injection under fluoroscopic guidance.  Anesthesia: Local  This procedure was carried out using fluoroscopic guidance.  The risks and benefits of the procedure were discussed extensively with the patient.  The consent of the patient was obtained and the following procedure was performed.  A timeout was performed with all essential staff present and the site and side were verified.  The right S1 neural foramen was then identified on right oblique "abdias dog" anatomical view at the 6 o' clock position using fluoroscopic guidance, and the area was marked. The overlying skin and subcutaneous structures were anesthetized using sterile technique with 1% Lidocaine.   A 22-gauge 3.5-inch spinal needle was directed toward the inferior (6 o'clock) position of the pedicle, which formed the roof of the identified foramen.  Once in the epidural space, after negative aspiration for heme and CSF, 1cc of ProHance contrast was injected to confirm epidural location and assess filling defects and rule out intravascular needle placement.   Lumbar epidurogram showed no intravascular or intrathecal flow pattern.  No blood or CSF was aspirated.  Prohance spread medially in epidural space and outlined the exiting nerve root.  After this, 2.5 cc of a mixture of 3 cc of preservative free normal saline plus 80 mg of Kenalog was injected in the epidural space.  Then attention was focused to the left S1 neural foramen. This was then identified on left oblique "abdias dog" anatomical view at the 6 o' clock position using fluoroscopic guidance, and the area was marked.  The overlying skin and subcutaneous structures were anesthetized using sterile technique with 1% Lidocaine.  A 22-gauge 3.5-inch spinal needle was directed toward the inferior (6 o'clock) position of the pedicle, which formed the roof of the identified foramen.  Once in the epidural space, after negative aspiration for heme and CSF, 1cc of Prohance contrast was injected to confirm epidural location and assess filling defects and rule out intravascular needle placement.   The following contrast flow observed: no intravascular or intrathecal flow pattern was noted.  No blood or CSF was aspirated. Prohance spread medially in epidural space.    After this, the remainder of the injectate listed above was injected in the epidural space.  Vital signs remained normal throughout the procedure.  The patient tolerated the procedure well.  There were no immediate complications from the performed procedure.  The patient was instructed to apply ice over the injection sites for twenty minutes every two hours for the next 24 hours.  Disposition:      1.  The patient was advised to F/U in 1-2 weeks to assess the response to the injection.      2.  The patient was also instructed to contact me immediately if there were any concerns related to the procedure performed.

## 2024-05-30 ENCOUNTER — APPOINTMENT (OUTPATIENT)
Dept: PAIN MANAGEMENT | Facility: CLINIC | Age: 54
End: 2024-05-30
Payer: COMMERCIAL

## 2024-05-30 VITALS — BODY MASS INDEX: 36.84 KG/M2 | HEIGHT: 73 IN | WEIGHT: 278 LBS

## 2024-05-30 DIAGNOSIS — R20.0 ANESTHESIA OF SKIN: ICD-10-CM

## 2024-05-30 PROCEDURE — 99213 OFFICE O/P EST LOW 20 MIN: CPT

## 2024-05-30 NOTE — PHYSICAL EXAM
[de-identified] : Constitutional:   - No acute distress   - Well developed; well nourished    Neurological:   - normal mood and affect   - alert and oriented x 3     Cardiovascular:   - grossly normal   Lumbar Spine Exam:  Inspection: erythema (-) ecchymosis (-) rashes (-) alignment: no scoliosis  Palpation: Midline lumbar tenderness:             (-) midline thoracic tenderness:          (-) Lumbar paraspinal tenderness:      L (-) ; R (-) thoracic paraspinal tenderness:    L (-) ; R (-) sciatic nerve tenderness :             L (-) ; R (-) SI joint tenderness:                        L (-) ; R (-) GTB tenderness:                            L (-);  R (-)  ROM: WNL  Strength:                                    Right       Left    Hip Flexion:                (5/5)       (5/5) Quadriceps:               (5/5)       (5/5) Hamstrings:                (5/5)       (5/5) Ankle Dorsiflexion:     (5/5)       (5/5) EHL:                           (5/5)       (5/5) Ankle Plantarflexion:  (5/5)       (5/5)  Special Tests: SLR:                           R (-) ; L (-) Facet loading:            R (-) ; L (-) JUAREZ test:               R (-) ; L (-) Hamstring tightness:  R (+);  L (+)  Neurologic: SI LT throughout right lower extremity SI LT throughout left lower extremity  Reflexes normal and symmetric bilateral lower extremities  Gait: non- antalgic gait ambulates without assistive device

## 2024-05-30 NOTE — ASSESSMENT
[FreeTextEntry1] : A discussion regarding available pain management treatment options occurred with the patient.  These included interventional, rehabilitative, pharmacological, and alternative modalities. We will proceed with the following:    Interventional treatment options:   - None indicated at present time - Patient without diagnostic response to lumbosacral JEANIE x 2; would not suggest further - see additional instructions below    Rehabilitative options: - Completed physical therapy - participation in active HEP was discussed    Medication based treatment options:   - Continue OTC NSAIDs on as-needed basis - Down titrate and discontinue Lyrica at patient request - Advised that there are other medication based therapies for neuropathic pain of the feet; he will discuss this with neurology  - see additional instructions below    Complementary treatment options:   - Weight management and lifestyle modifications discussed    Additional treatment recommendations as follows:  - Agree with neurological evaluation with ongoing paresthesias of the lower extremities - Follow-up on as-needed basis  The documentation recorded by the scribe, in my presence, accurately reflects the service I personally performed and the decisions made by me with my edits as appropriate.   I, Ranjan Tay acting as scribe, attest that this documentation has been prepared under the direction and in the presence of Provider King Pastor DO.

## 2024-05-30 NOTE — HISTORY OF PRESENT ILLNESS
[Lower back] : lower back [Buttock] : buttock [Left Leg] : left leg [Right Leg] : right leg [Sudden] : sudden [6] : 6 [Dull/Aching] : dull/aching [Intermittent] : intermittent [Household chores] : household chores [Leisure] : leisure [FreeTextEntry1] : 2024 - Patient presents for FUV after a bilateral S1 TFESI on 2024. Patient reports no pain relief s/p epidural, he continues to have paresthesias bilaterally in the plantar aspect of his feet; he continues to take 150 mg of Lyrica daily with no benefit.   2024 - Patient presents for FUV bilateral L5-S1 TFESI on 2024. Patient reports no change in pain s/p epidural, he continues to have paresthesias bilaterally in the plantar aspect of his feet.  2024 - The patient presents for initial evaluation regarding their bilateral foot paresthesias. Patient was referred by Dr. Del Rosario. Patient complains of paresthesias bilaterally in the feet since December, the sensation goes from the toes to the plantar aspect of both feet, he denies any lower back pain at the time; he previously had radicular leg pain which resolved with a MDP. Patient has failed Lyrica at the 150 mg daily dose; and had undergone a C5-7 ACDF with Dr. Del Rosario on 3/25/2024.   Injections: 1) Bilateral L5-S1 TFESI (2024) 2) Bilateral S1 TFESI (2024)  Pertinent Surgical History: 1) C5-7 ACDF (3/25/2024) - Dr. Del Rosario  Imagin) MRI Cervical Spine (2/10/2024) - [facility]  2) MRI lumbar spine (2/10/2024)  - [place]   Electrodiagnostic Studies: EMG/NCV- 2024 1. Right greater than left, acute and chronic L5 S1 radiculopathy 2. There is no electrophysiologic evidence for isolated left peroneal neuropathy or large fiber polyneuropathy at this time.   Physician Disclaimer: I have personally reviewed and confirmed all HPI data with the patient.  [] : Patient is currently injured and not playing sports: no [de-identified] : lumbar mri at [facility]

## 2024-06-19 ENCOUNTER — APPOINTMENT (OUTPATIENT)
Dept: ORTHOPEDIC SURGERY | Facility: CLINIC | Age: 54
End: 2024-06-19
Payer: COMMERCIAL

## 2024-06-19 DIAGNOSIS — M54.12 RADICULOPATHY, CERVICAL REGION: ICD-10-CM

## 2024-06-19 DIAGNOSIS — M51.26 OTHER INTERVERTEBRAL DISC DISPLACEMENT, LUMBAR REGION: ICD-10-CM

## 2024-06-19 PROCEDURE — 72040 X-RAY EXAM NECK SPINE 2-3 VW: CPT

## 2024-06-19 PROCEDURE — 99024 POSTOP FOLLOW-UP VISIT: CPT

## 2024-06-19 NOTE — DISCUSSION/SUMMARY
[de-identified] : Cervical:  54 y/o M s/p ACDF C5-6,6-7 on march 5th 2024.  XR today reveals good maintenance of alignment and implant placement, good progress to fusion Patient is recovering well post operatively. Explained expected outcomes post op including reduction in pain, improvement in function and expected recovery timeframe. All questions were answered to their satisfaction.   Lumbar:  - L1/2 HNP central with crowding cauda equina. L4/5 mod stenosis with 5 nerve root impingement, small hnp RT side L5S1.  After undergoing two lumbar epidural steroid injections (LESIs) with Dr. Yoon without any improvement in symptoms, it is likely that the progressive burning sensation in the toes and balls of the feet is not associated with the L5S1 region. Acute surgical intervention on the lumbar spine is not recommended, given the lack of transient diagnostic relief from LESI and Lyrica. However, to definitively diagnose the issue, an updated MRI of the lumbar spine is requested to evaluate for L1-2 hnp/stenosis versus cauda equina syndrome. A referral has also been made for a consultation with neurologist Dr. Yuli Marrero for further evaluation.  Follow-up is scheduled for 6 weeks.  Prior to appointment and during encounter with patient extensive medical records were reviewed including but not limited to, hospital records, outpatient records, imaging results, and lab data.During this appointment the patient was examined, diagnoses were discussed and explained in a face to face manner. In addition extensive time was spent reviewing aforementioned diagnostic studies. Counseling including abnormal image results, differential diagnoses, treatment options, risk and benefits, lifestyle changes, current condition, and current medications was performed. Patient's comments, questions, and concerns were addressed and patient verbalized understanding. Based on this patient's presentation at our office, which is an orthopedic spine surgeon's office, this patient inherently / intrinsically has a risk, however minute, of developing issues such as Cauda equina syndrome, bowel and bladder changes, or progression of motor or neurological deficits such as paralysis which may be permanent.  TIFFANY HOWARD Acting as a Scribe for Dr. Miguel Ángel MCGHEE, Tiffany Howard, attest that this documentation has been prepared under the direction and in the presence of Provider Kenny Del Rosario MD.

## 2024-06-19 NOTE — HISTORY OF PRESENT ILLNESS
[de-identified] : 06/19/2024 - Patient presents in follow up for cervical and lumbar. He is s/p ACDF on march 25th 2024. Cervical : C/o tingling in shoulder, diminished cervical rom, and mild cervical pain. Overall states severity of pain much improved compared to his pre operative pain levels. He is using bone stimulator as indicated.  Lumbar: C/o progressive burning in the balls of his feet and toes. Two LESIs with Dr. Pastor afforded zero change in symptoms even temporarily. Treated with Lyrica which was not effacious and reports no change since stopping the medication. He denies any saddle anesthesia, and has full control of bowel/bladder. F/u with Dr. Weber where he was then referred to a different neurologist and a definitive diagnosis was not made.   05/01/2024 - Patient presents for Televisit. He is s/p ACDF on march 25th 2024. States neck pain severity has improved as a result of surgery, as well as his overall cervical ROM. Feels he is gradually improving with time. However, after the LESI and Lyrica, stil c/o burning, tingling, and numbness in his feet. states foot symptoms are constant and do not worsen with specific activities. JEANIE was performed last Wednesday. Bowel/bladder function is intact.   02/28/2024 - Patient presenting in follow up and review of EMG. Neck SXs > Lumbar SXs. Cervical SXs: Significant neck, proximal LT shoulder/ arm pain. States pain if effecting quality of life and he is considering surgery. Lumbar SXs: radiating right buttock/hip pain, posterior radiating leg pain. He denies any saddle anesthesia, and has full control of bowel/bladder. He reports some relief of neck, LT proximal UE/shoulder pain, and radiating leg pain after oral steroid but notes a recent return of pain. The burning/numbness in his feet remained unchanged, significant and constant in nature. He has been enrolled in PT but pain has been persistent.   02/14/2024 - Patient presents to the office today for evaluation with regard to cervical, thoracic and lumbar disc herniation. Patient indicates a multiple decade, history of intermittent, spinal pain often times self manage with activity modifications and over-the-counter medication. Several weeks ago he began to experience new onset numbness, bilateral feet. Initial work out by primary care and podiatry was negative. Patient symptoms increased with sciatica into the right leg requiring emergency room evaluation several days ago at [facility]. Multiple MRIs were performed of the cervical thoracic and lumbar spine identify disc herniations in cervical thoracic and lumbar region. Concerns were raised regarding L1/2 disc herniation crowding, cauda equina. Patient was started on IV prednisone with 50 mg oral taper. Symptoms have improved with regard to cervical pain, shoulder pain and right leg symptoms. He continues to offer complaints of sitting intolerance and subjective weakness in the right leg. Denies bowel or bladder changes. Denies saddle anesthesia.   The patient is a 53 year old male who presents today complaining of back pain Date of Injury/Onset: December  Pain:    At Rest: 6/10 With Activity:  12/10 Mechanism of injury: onset Quality of symptoms: Sharp, Shooting, tingling, burning, numbness Improves with: advil Worse with: moving Prior treatment: no Prior Imaging: MRI @ [facility] Additional Information: None

## 2024-06-19 NOTE — DATA REVIEWED
[EMG Nerve Conduction] : A EMG Nerve Conduction test was completed of the [Lower extremity] : lower extremity [FreeTextEntry1] : On my interpretation of these images MRI, cervical spine [facility], imaging 2/10/24 C3/4 left foraminal discrimination in pinging left exiting C4 nerve. C4/5 left sided herniation encroaching left C5 nerve root. C5/6 right sided discrimination impinging right side, C6 nerve   in office x-rays cervical spine ap/lat 06/19/2024 demonstrates good maintenance of alignment and implant placement, good progress to fusion  I stop paperwork reviewed Pain mgmt progress notes reviewed  Thoracic MRI, 2/10/24, [facility] Multiple thoracic disc herniation.  T1/2 right sided subarticular herniation T2/3 bilateral herniation impinging ventral aspect of cord T3/4 large bulge in pinging ventral, spinal cord T4/5 disc bulge and pinging  Lumbar 2/10/24 L1/2 HNP central with crowding cauda equina. L4/5 mod stenosis with 5 nerve root impingement, small hnp RT side L5S1.   I stop paperwork reviewed Pain mgmt progress notes reviewed

## 2024-06-25 ENCOUNTER — APPOINTMENT (OUTPATIENT)
Dept: MRI IMAGING | Facility: CLINIC | Age: 54
End: 2024-06-25
Payer: COMMERCIAL

## 2024-06-25 PROCEDURE — 72148 MRI LUMBAR SPINE W/O DYE: CPT

## 2024-09-18 ENCOUNTER — APPOINTMENT (OUTPATIENT)
Dept: ORTHOPEDIC SURGERY | Facility: CLINIC | Age: 54
End: 2024-09-18
Payer: COMMERCIAL

## 2024-09-18 DIAGNOSIS — M54.12 RADICULOPATHY, CERVICAL REGION: ICD-10-CM

## 2024-09-18 DIAGNOSIS — M51.26 OTHER INTERVERTEBRAL DISC DISPLACEMENT, LUMBAR REGION: ICD-10-CM

## 2024-09-18 DIAGNOSIS — M48.061 SPINAL STENOSIS, LUMBAR REGION WITHOUT NEUROGENIC CLAUDICATION: ICD-10-CM

## 2024-09-18 PROCEDURE — 72040 X-RAY EXAM NECK SPINE 2-3 VW: CPT

## 2024-09-18 PROCEDURE — 99214 OFFICE O/P EST MOD 30 MIN: CPT

## 2024-09-21 NOTE — HISTORY OF PRESENT ILLNESS
[de-identified] : 09/18/2024 - Patient presenting in follow up.  He sought care with different neurologist which was more helpful. He is scheduled for a 2nd EMG to determine if there are any positive findings. The intensity of numbness in feet is about the same, with some progression into balls of his feet. He denies radiating numbness or pain throughout buttocks or legs. No relief after trials of Lyrica.  Pre surgical states 9/10 pain. Once surgical pain evaded states 2/10 pain. 2 months ago he states pain escalated back to 8/10 pain. left side neck pain/stiffness, migraines, radiating pain/tingling into left shoulder not extending throughout arm. Over the last month he states his pain has somewhat improved to 5/10. No trauma, specific injuries, change in routine. Has taken Advil with mild relief.   06/19/2024 - Patient presents in follow up for cervical and lumbar. He is s/p ACDF on march 25th 2024. Cervical : C/o tingling in shoulder, diminished cervical rom, and mild cervical pain. Overall states severity of pain much improved compared to his pre operative pain levels. He is using bone stimulator as indicated.  Lumbar: C/o progressive burning in the balls of his feet and toes. Two LESIs with Dr. Pastor afforded zero change in symptoms even temporarily. Treated with Lyrica which was not effacious and reports no change since stopping the medication. He denies any saddle anesthesia, and has full control of bowel/bladder. F/u with Dr. Weber where he was then referred to a different neurologist and a definitive diagnosis was not made.   05/01/2024 - Patient presents for Televisit. He is s/p ACDF on march 25th 2024. States neck pain severity has improved as a result of surgery, as well as his overall cervical ROM. Feels he is gradually improving with time. However, after the LESI and Lyrica, stil c/o burning, tingling, and numbness in his feet. states foot symptoms are constant and do not worsen with specific activities. JEANIE was performed last Wednesday. Bowel/bladder function is intact.   02/28/2024 - Patient presenting in follow up and review of EMG. Neck SXs > Lumbar SXs. Cervical SXs: Significant neck, proximal LT shoulder/ arm pain. States pain if effecting quality of life and he is considering surgery. Lumbar SXs: radiating right buttock/hip pain, posterior radiating leg pain. He denies any saddle anesthesia, and has full control of bowel/bladder. He reports some relief of neck, LT proximal UE/shoulder pain, and radiating leg pain after oral steroid but notes a recent return of pain. The burning/numbness in his feet remained unchanged, significant and constant in nature. He has been enrolled in PT but pain has been persistent.   02/14/2024 - Patient presents to the office today for evaluation with regard to cervical, thoracic and lumbar disc herniation. Patient indicates a multiple decade, history of intermittent, spinal pain often times self manage with activity modifications and over-the-counter medication. Several weeks ago he began to experience new onset numbness, bilateral feet. Initial work out by primary care and podiatry was negative. Patient symptoms increased with sciatica into the right leg requiring emergency room evaluation several days ago at [place]. Multiple MRIs were performed of the cervical thoracic and lumbar spine identify disc herniations in cervical thoracic and lumbar region. Concerns were raised regarding L1/2 disc herniation crowding, cauda equina. Patient was started on IV prednisone with 50 mg oral taper. Symptoms have improved with regard to cervical pain, shoulder pain and right leg symptoms. He continues to offer complaints of sitting intolerance and subjective weakness in the right leg. Denies bowel or bladder changes. Denies saddle anesthesia.   The patient is a 53 year old male who presents today complaining of back pain Date of Injury/Onset: December  Pain:    At Rest: 6/10 With Activity:  12/10 Mechanism of injury: onset Quality of symptoms: Sharp, Shooting, tingling, burning, numbness Improves with: advil Worse with: moving Prior treatment: no Prior Imaging: MRI @ [place] Additional Information: None

## 2024-09-21 NOTE — HISTORY OF PRESENT ILLNESS
[de-identified] : 09/18/2024 - Patient presenting in follow up.  He sought care with different neurologist which was more helpful. He is scheduled for a 2nd EMG to determine if there are any positive findings. The intensity of numbness in feet is about the same, with some progression into balls of his feet. He denies radiating numbness or pain throughout buttocks or legs. No relief after trials of Lyrica.  Pre surgical states 9/10 pain. Once surgical pain evaded states 2/10 pain. 2 months ago he states pain escalated back to 8/10 pain. left side neck pain/stiffness, migraines, radiating pain/tingling into left shoulder not extending throughout arm. Over the last month he states his pain has somewhat improved to 5/10. No trauma, specific injuries, change in routine. Has taken Advil with mild relief.   06/19/2024 - Patient presents in follow up for cervical and lumbar. He is s/p ACDF on march 25th 2024. Cervical : C/o tingling in shoulder, diminished cervical rom, and mild cervical pain. Overall states severity of pain much improved compared to his pre operative pain levels. He is using bone stimulator as indicated.  Lumbar: C/o progressive burning in the balls of his feet and toes. Two LESIs with Dr. Pastor afforded zero change in symptoms even temporarily. Treated with Lyrica which was not effacious and reports no change since stopping the medication. He denies any saddle anesthesia, and has full control of bowel/bladder. F/u with Dr. Weber where he was then referred to a different neurologist and a definitive diagnosis was not made.   05/01/2024 - Patient presents for Televisit. He is s/p ACDF on march 25th 2024. States neck pain severity has improved as a result of surgery, as well as his overall cervical ROM. Feels he is gradually improving with time. However, after the LESI and Lyrica, stil c/o burning, tingling, and numbness in his feet. states foot symptoms are constant and do not worsen with specific activities. JEANIE was performed last Wednesday. Bowel/bladder function is intact.   02/28/2024 - Patient presenting in follow up and review of EMG. Neck SXs > Lumbar SXs. Cervical SXs: Significant neck, proximal LT shoulder/ arm pain. States pain if effecting quality of life and he is considering surgery. Lumbar SXs: radiating right buttock/hip pain, posterior radiating leg pain. He denies any saddle anesthesia, and has full control of bowel/bladder. He reports some relief of neck, LT proximal UE/shoulder pain, and radiating leg pain after oral steroid but notes a recent return of pain. The burning/numbness in his feet remained unchanged, significant and constant in nature. He has been enrolled in PT but pain has been persistent.   02/14/2024 - Patient presents to the office today for evaluation with regard to cervical, thoracic and lumbar disc herniation. Patient indicates a multiple decade, history of intermittent, spinal pain often times self manage with activity modifications and over-the-counter medication. Several weeks ago he began to experience new onset numbness, bilateral feet. Initial work out by primary care and podiatry was negative. Patient symptoms increased with sciatica into the right leg requiring emergency room evaluation several days ago at [facility]. Multiple MRIs were performed of the cervical thoracic and lumbar spine identify disc herniations in cervical thoracic and lumbar region. Concerns were raised regarding L1/2 disc herniation crowding, cauda equina. Patient was started on IV prednisone with 50 mg oral taper. Symptoms have improved with regard to cervical pain, shoulder pain and right leg symptoms. He continues to offer complaints of sitting intolerance and subjective weakness in the right leg. Denies bowel or bladder changes. Denies saddle anesthesia.   The patient is a 53 year old male who presents today complaining of back pain Date of Injury/Onset: December  Pain:    At Rest: 6/10 With Activity:  12/10 Mechanism of injury: onset Quality of symptoms: Sharp, Shooting, tingling, burning, numbness Improves with: advil Worse with: moving Prior treatment: no Prior Imaging: MRI @ [place] Additional Information: None

## 2024-09-21 NOTE — DISCUSSION/SUMMARY
[de-identified] : Cervical:  53 y/o M s/p ACDF C5-6,6-7 on March 5th 2024.  XR today reveals good maintenance of alignment and implant placement, good progress to fusion Patient is recovering well post operatively. Explained expected outcomes post op including reduction in pain, improvement in function and expected recovery timeframe. All questions were answered to their satisfaction.  Discussed implementing routine stretching regimen and use with hot compresses for neck pain control. Referred to acupuncture  Lumbar:  - Updated MRI June 25th reveals mod/severe stenosis L4/5 worse compared to Feb MRI. stable L1/2 HNP central with crowding cauda equina mildly improved compared to prior Feb 2024 MRI.  After undergoing two lumbar epidural steroid injections (LESIs) with Dr. Yoon without any improvement in symptoms, it is likely that the progressive burning sensation in the toes and balls of the feet is not associated with the L5S1 region. Acute surgical intervention on the lumbar spine is not recommended, given the lack of transient diagnostic relief from LESI and Lyrica. He will continue to consult with neurologist for further evaluation.   Follow-up is scheduled for 6 weeks.  Prior to appointment and during encounter with patient extensive medical records were reviewed including but not limited to, hospital records, outpatient records, imaging results, and lab data.During this appointment the patient was examined, diagnoses were discussed and explained in a face to face manner. In addition extensive time was spent reviewing aforementioned diagnostic studies. Counseling including abnormal image results, differential diagnoses, treatment options, risk and benefits, lifestyle changes, current condition, and current medications was performed. Patient's comments, questions, and concerns were addressed and patient verbalized understanding. Based on this patient's presentation at our office, which is an orthopedic spine surgeon's office, this patient inherently / intrinsically has a risk, however minute, of developing issues such as Cauda equina syndrome, bowel and bladder changes, or progression of motor or neurological deficits such as paralysis which may be permanent.  TIFFANY GARZA Acting as a Scribe for Tiffany Chawla, attest that this documentation has been prepared under the direction and in the presence of Provider Kenny Del Rosario MD.

## 2024-09-21 NOTE — DISCUSSION/SUMMARY
[de-identified] : Cervical:  55 y/o M s/p ACDF C5-6,6-7 on March 5th 2024.  XR today reveals good maintenance of alignment and implant placement, good progress to fusion Patient is recovering well post operatively. Explained expected outcomes post op including reduction in pain, improvement in function and expected recovery timeframe. All questions were answered to their satisfaction.  Discussed implementing routine stretching regimen and use with hot compresses for neck pain control. Referred to acupuncture  Lumbar:  - Updated MRI June 25th reveals mod/severe stenosis L4/5 worse compared to Feb MRI. stable L1/2 HNP central with crowding cauda equina mildly improved compared to prior Feb 2024 MRI.  After undergoing two lumbar epidural steroid injections (LESIs) with Dr. Yoon without any improvement in symptoms, it is likely that the progressive burning sensation in the toes and balls of the feet is not associated with the L5S1 region. Acute surgical intervention on the lumbar spine is not recommended, given the lack of transient diagnostic relief from LESI and Lyrica. He will continue to consult with neurologist for further evaluation.   Follow-up is scheduled for 6 weeks.  Prior to appointment and during encounter with patient extensive medical records were reviewed including but not limited to, hospital records, outpatient records, imaging results, and lab data.During this appointment the patient was examined, diagnoses were discussed and explained in a face to face manner. In addition extensive time was spent reviewing aforementioned diagnostic studies. Counseling including abnormal image results, differential diagnoses, treatment options, risk and benefits, lifestyle changes, current condition, and current medications was performed. Patient's comments, questions, and concerns were addressed and patient verbalized understanding. Based on this patient's presentation at our office, which is an orthopedic spine surgeon's office, this patient inherently / intrinsically has a risk, however minute, of developing issues such as Cauda equina syndrome, bowel and bladder changes, or progression of motor or neurological deficits such as paralysis which may be permanent.  TIFFANY GARZA Acting as a Scribe for Tiffany Chawla, attest that this documentation has been prepared under the direction and in the presence of Provider Kenny Del Rosario MD.

## 2024-09-21 NOTE — DATA REVIEWED
[EMG Nerve Conduction] : A EMG Nerve Conduction test was completed of the [Lower extremity] : lower extremity [FreeTextEntry1] : On my interpretation of these images in office x-rays cervical spine ap/lat 09/18/2024  good maintenance of alignment and implant placement, good progress to fusion  On my interpretation of these images and reports Lumbar spine MRI [facility] 6/25/24 mod/severe stenosis L4/5 worse compared to Feb mri. stable L1/2 HNP central with crowding cauda equina mildly improved compared to prior Feb 2024 mri   On my interpretation of these images MRI, cervical spine Jose Emerson, imaging 2/10/24 C3/4 left foraminal discrimination in pinging left exiting C4 nerve. C4/5 left sided herniation encroaching left C5 nerve root. C5/6 right sided discrimination impinging right side, C6 nerve   in office x-rays cervical spine ap/lat 06/19/2024 demonstrates good maintenance of alignment and implant placement, good progress to fusion  I stop paperwork reviewed Pain mgmt progress notes reviewed  Thoracic MRI, 2/10/24, Jose Emerson Multiple thoracic disc herniation.  T1/2 right sided subarticular herniation T2/3 bilateral herniation impinging ventral aspect of cord T3/4 large bulge in pinging ventral, spinal cord T4/5 disc bulge and pinging  [place]24 L1/2 HNP central with crowding cauda equina. L4/5 mod stenosis with 5 nerve root impingement, small hnp RT side [id number].   I stop paperwork reviewed

## 2024-09-21 NOTE — DATA REVIEWED
[EMG Nerve Conduction] : A EMG Nerve Conduction test was completed of the [Lower extremity] : lower extremity [FreeTextEntry1] : On my interpretation of these images in office x-rays cervical spine ap/lat 09/18/2024  good maintenance of alignment and implant placement, good progress to fusion  On my interpretation of these images and reports Lumbar spine MRI [facility] 6/25/24 mod/severe stenosis L4/5 worse compared to Feb mri. stable L1/2 HNP central with crowding cauda equina mildly improved compared to prior Feb 2024 mri   On my interpretation of these images MRI, cervical spine Jose Emerson, imaging 2/10/24 C3/4 left foraminal discrimination in pinging left exiting C4 nerve. C4/5 left sided herniation encroaching left C5 nerve root. C5/6 right sided discrimination impinging right side, C6 nerve   in office x-rays cervical spine ap/lat 06/19/2024 demonstrates good maintenance of alignment and implant placement, good progress to fusion  I stop paperwork reviewed Pain mgmt progress notes reviewed  Thoracic MRI, 2/10/24, Jose Emerson Multiple thoracic disc herniation.  T1/2 right sided subarticular herniation T2/3 bilateral herniation impinging ventral aspect of cord T3/4 large bulge in pinging ventral, spinal cord T4/5 disc bulge and pinging  [place]24 L1/2 HNP central with crowding cauda equina. L4/5 mod stenosis with 5 nerve root impingement, small hnp RT side L5S1.   I stop paperwork reviewed

## 2024-11-05 ENCOUNTER — NON-APPOINTMENT (OUTPATIENT)
Age: 54
End: 2024-11-05

## 2024-11-13 ENCOUNTER — APPOINTMENT (OUTPATIENT)
Dept: CARDIOTHORACIC SURGERY | Facility: CLINIC | Age: 54
End: 2024-11-13
Payer: COMMERCIAL

## 2024-11-13 VITALS
TEMPERATURE: 98.5 F | BODY MASS INDEX: 35.78 KG/M2 | DIASTOLIC BLOOD PRESSURE: 92 MMHG | HEART RATE: 66 BPM | HEIGHT: 73 IN | WEIGHT: 270 LBS | OXYGEN SATURATION: 98 % | RESPIRATION RATE: 16 BRPM | SYSTOLIC BLOOD PRESSURE: 169 MMHG

## 2024-11-13 DIAGNOSIS — G47.33 OBSTRUCTIVE SLEEP APNEA (ADULT) (PEDIATRIC): ICD-10-CM

## 2024-11-13 DIAGNOSIS — I38 ENDOCARDITIS, VALVE UNSPECIFIED: ICD-10-CM

## 2024-11-13 DIAGNOSIS — Q23.81 BICUSPID AORTIC VALVE: ICD-10-CM

## 2024-11-13 PROCEDURE — 99203 OFFICE O/P NEW LOW 30 MIN: CPT

## 2024-12-11 ENCOUNTER — APPOINTMENT (OUTPATIENT)
Dept: ORTHOPEDIC SURGERY | Facility: CLINIC | Age: 54
End: 2024-12-11

## 2025-02-12 ENCOUNTER — APPOINTMENT (OUTPATIENT)
Dept: ORTHOPEDIC SURGERY | Facility: CLINIC | Age: 55
End: 2025-02-12
Payer: COMMERCIAL

## 2025-02-12 VITALS — HEIGHT: 73 IN | WEIGHT: 270 LBS | BODY MASS INDEX: 35.78 KG/M2

## 2025-02-12 DIAGNOSIS — M51.26 OTHER INTERVERTEBRAL DISC DISPLACEMENT, LUMBAR REGION: ICD-10-CM

## 2025-02-12 DIAGNOSIS — M54.12 RADICULOPATHY, CERVICAL REGION: ICD-10-CM

## 2025-02-12 PROCEDURE — 99214 OFFICE O/P EST MOD 30 MIN: CPT

## 2025-02-12 RX ORDER — MELOXICAM 15 MG/1
15 TABLET ORAL DAILY
Qty: 30 | Refills: 1 | Status: ACTIVE | COMMUNITY
Start: 2025-02-12 | End: 1900-01-01

## 2025-02-12 RX ORDER — GABAPENTIN 300 MG/1
300 CAPSULE ORAL 3 TIMES DAILY
Qty: 90 | Refills: 0 | Status: ACTIVE | COMMUNITY
Start: 2025-02-12 | End: 1900-01-01

## 2025-02-12 RX ORDER — METHYLPREDNISOLONE 4 MG/1
4 TABLET ORAL
Qty: 21 | Refills: 0 | Status: ACTIVE | COMMUNITY
Start: 2025-02-12 | End: 1900-01-01

## 2025-02-12 RX ORDER — AMITRIPTYLINE HYDROCHLORIDE 25 MG/1
25 TABLET, FILM COATED ORAL
Qty: 30 | Refills: 0 | Status: ACTIVE | COMMUNITY
Start: 2025-02-12 | End: 1900-01-01

## 2025-03-21 RX ORDER — GABAPENTIN 300 MG/1
300 CAPSULE ORAL 3 TIMES DAILY
Qty: 63 | Refills: 0 | Status: ACTIVE | COMMUNITY
Start: 2025-03-21 | End: 1900-01-01

## 2025-05-14 ENCOUNTER — APPOINTMENT (OUTPATIENT)
Dept: ORTHOPEDIC SURGERY | Facility: CLINIC | Age: 55
End: 2025-05-14
Payer: COMMERCIAL

## 2025-05-14 VITALS — BODY MASS INDEX: 35.78 KG/M2 | WEIGHT: 270 LBS | HEIGHT: 73 IN

## 2025-05-14 DIAGNOSIS — G60.8 OTHER HEREDITARY AND IDIOPATHIC NEUROPATHIES: ICD-10-CM

## 2025-05-14 DIAGNOSIS — M54.12 RADICULOPATHY, CERVICAL REGION: ICD-10-CM

## 2025-05-14 DIAGNOSIS — M54.16 RADICULOPATHY, LUMBAR REGION: ICD-10-CM

## 2025-05-14 PROCEDURE — 99214 OFFICE O/P EST MOD 30 MIN: CPT

## 2025-05-14 RX ORDER — GABAPENTIN 300 MG/1
300 CAPSULE ORAL 3 TIMES DAILY
Qty: 90 | Refills: 2 | Status: ACTIVE | COMMUNITY
Start: 2025-05-14 | End: 1900-01-01

## 2025-05-15 PROBLEM — G60.8 OTHER SPECIFIED IDIOPATHIC PERIPHERAL NEUROPATHY: Status: ACTIVE | Noted: 2025-05-15

## 2025-06-19 NOTE — ED ADULT NURSE NOTE - ATTEMPT TO OOB
The risk of chronic, cumulative sun damage and risk of development of skin cancer was reviewed today.   The importance of sun protection was reviewed: including the use of a broad spectrum sunscreen of at least SPF 30 that protects against both UVA/UVB rays, with ingredients such as Zinc oxide or titanium dioxide, wearing sun protective clothing and sun avoidance. We reviewed the warning signs of non-melanoma skin cancer and ABCDEs of melanoma  Please follow up should you notice any new or changing pre-existing skin lesion.   no

## 2025-06-23 RX ORDER — GABAPENTIN 300 MG/1
300 CAPSULE ORAL 3 TIMES DAILY
Qty: 270 | Refills: 1 | Status: ACTIVE | COMMUNITY
Start: 2025-06-23 | End: 1900-01-01

## 2025-09-03 ENCOUNTER — APPOINTMENT (OUTPATIENT)
Dept: ORTHOPEDIC SURGERY | Facility: CLINIC | Age: 55
End: 2025-09-03
Payer: COMMERCIAL

## 2025-09-03 VITALS — WEIGHT: 270 LBS | HEIGHT: 73 IN | BODY MASS INDEX: 35.78 KG/M2

## 2025-09-03 DIAGNOSIS — M54.12 RADICULOPATHY, CERVICAL REGION: ICD-10-CM

## 2025-09-03 DIAGNOSIS — M51.26 OTHER INTERVERTEBRAL DISC DISPLACEMENT, LUMBAR REGION: ICD-10-CM

## 2025-09-03 PROCEDURE — 99214 OFFICE O/P EST MOD 30 MIN: CPT

## 2025-09-03 RX ORDER — GABAPENTIN 300 MG/1
300 CAPSULE ORAL 3 TIMES DAILY
Qty: 90 | Refills: 2 | Status: ACTIVE | COMMUNITY
Start: 2025-09-03 | End: 1900-01-01